# Patient Record
Sex: FEMALE | Race: WHITE | NOT HISPANIC OR LATINO | Employment: OTHER | URBAN - NONMETROPOLITAN AREA
[De-identification: names, ages, dates, MRNs, and addresses within clinical notes are randomized per-mention and may not be internally consistent; named-entity substitution may affect disease eponyms.]

---

## 2018-06-19 ENCOUNTER — APPOINTMENT (OUTPATIENT)
Dept: GENERAL RADIOLOGY | Facility: HOSPITAL | Age: 68
End: 2018-06-19

## 2018-06-19 ENCOUNTER — HOSPITAL ENCOUNTER (EMERGENCY)
Facility: HOSPITAL | Age: 68
Discharge: HOME | End: 2018-06-19
Attending: EMERGENCY MEDICINE | Admitting: EMERGENCY MEDICINE

## 2018-06-19 ENCOUNTER — APPOINTMENT (OUTPATIENT)
Dept: CT IMAGING | Facility: HOSPITAL | Age: 68
End: 2018-06-19

## 2018-06-19 VITALS
HEART RATE: 65 BPM | SYSTOLIC BLOOD PRESSURE: 160 MMHG | TEMPERATURE: 98.1 F | HEIGHT: 64 IN | RESPIRATION RATE: 15 BRPM | WEIGHT: 162 LBS | OXYGEN SATURATION: 95 % | DIASTOLIC BLOOD PRESSURE: 61 MMHG | BODY MASS INDEX: 27.66 KG/M2

## 2018-06-19 DIAGNOSIS — R07.9 CHEST PAIN, UNSPECIFIED TYPE: Primary | ICD-10-CM

## 2018-06-19 LAB
ALBUMIN SERPL-MCNC: 4.3 G/DL (ref 3.5–5)
ALBUMIN/GLOB SERPL: 1.5 G/DL (ref 1.1–2.5)
ALP SERPL-CCNC: 76 U/L (ref 24–120)
ALT SERPL W P-5'-P-CCNC: 39 U/L (ref 0–54)
ANION GAP SERPL CALCULATED.3IONS-SCNC: 13 MMOL/L (ref 4–13)
AST SERPL-CCNC: 30 U/L (ref 7–45)
BASOPHILS # BLD AUTO: 0.05 10*3/MM3 (ref 0–0.2)
BASOPHILS NFR BLD AUTO: 0.7 % (ref 0–2)
BILIRUB SERPL-MCNC: 0.3 MG/DL (ref 0.1–1)
BUN BLD-MCNC: 15 MG/DL (ref 5–21)
BUN/CREAT SERPL: 20.5 (ref 7–25)
CALCIUM SPEC-SCNC: 9.8 MG/DL (ref 8.4–10.4)
CHLORIDE SERPL-SCNC: 102 MMOL/L (ref 98–110)
CO2 SERPL-SCNC: 26 MMOL/L (ref 24–31)
CREAT BLD-MCNC: 0.73 MG/DL (ref 0.5–1.4)
D DIMER PPP FEU-MCNC: 0.73 MG/L (FEU) (ref 0–0.5)
DEPRECATED RDW RBC AUTO: 39.4 FL (ref 40–54)
EOSINOPHIL # BLD AUTO: 0.15 10*3/MM3 (ref 0–0.7)
EOSINOPHIL NFR BLD AUTO: 2.1 % (ref 0–4)
ERYTHROCYTE [DISTWIDTH] IN BLOOD BY AUTOMATED COUNT: 12.1 % (ref 12–15)
GFR SERPL CREATININE-BSD FRML MDRD: 80 ML/MIN/1.73
GLOBULIN UR ELPH-MCNC: 2.9 GM/DL
GLUCOSE BLD-MCNC: 101 MG/DL (ref 70–100)
HCT VFR BLD AUTO: 37 % (ref 37–47)
HGB BLD-MCNC: 12.6 G/DL (ref 12–16)
HOLD SPECIMEN: NORMAL
HOLD SPECIMEN: NORMAL
IMM GRANULOCYTES # BLD: 0.03 10*3/MM3 (ref 0–0.03)
IMM GRANULOCYTES NFR BLD: 0.4 % (ref 0–5)
INR PPP: 0.85 (ref 0.91–1.09)
LYMPHOCYTES # BLD AUTO: 2.44 10*3/MM3 (ref 0.72–4.86)
LYMPHOCYTES NFR BLD AUTO: 33.7 % (ref 15–45)
MCH RBC QN AUTO: 30.7 PG (ref 28–32)
MCHC RBC AUTO-ENTMCNC: 34.1 G/DL (ref 33–36)
MCV RBC AUTO: 90.2 FL (ref 82–98)
MONOCYTES # BLD AUTO: 0.6 10*3/MM3 (ref 0.19–1.3)
MONOCYTES NFR BLD AUTO: 8.3 % (ref 4–12)
NEUTROPHILS # BLD AUTO: 3.97 10*3/MM3 (ref 1.87–8.4)
NEUTROPHILS NFR BLD AUTO: 54.8 % (ref 39–78)
NRBC BLD MANUAL-RTO: 0 /100 WBC (ref 0–0)
NT-PROBNP SERPL-MCNC: 71.1 PG/ML (ref 0–900)
PLATELET # BLD AUTO: 188 10*3/MM3 (ref 130–400)
PMV BLD AUTO: 9.8 FL (ref 6–12)
POTASSIUM BLD-SCNC: 3.8 MMOL/L (ref 3.5–5.3)
PROT SERPL-MCNC: 7.2 G/DL (ref 6.3–8.7)
PROTHROMBIN TIME: 11.9 SECONDS (ref 11.9–14.6)
RBC # BLD AUTO: 4.1 10*6/MM3 (ref 4.2–5.4)
SODIUM BLD-SCNC: 141 MMOL/L (ref 135–145)
TROPONIN I SERPL-MCNC: <0.012 NG/ML (ref 0–0.03)
TROPONIN I SERPL-MCNC: <0.012 NG/ML (ref 0–0.03)
WBC NRBC COR # BLD: 7.24 10*3/MM3 (ref 4.8–10.8)
WHOLE BLOOD HOLD SPECIMEN: NORMAL
WHOLE BLOOD HOLD SPECIMEN: NORMAL

## 2018-06-19 PROCEDURE — 85025 COMPLETE CBC W/AUTO DIFF WBC: CPT | Performed by: EMERGENCY MEDICINE

## 2018-06-19 PROCEDURE — 0 IOPAMIDOL PER 1 ML: Performed by: EMERGENCY MEDICINE

## 2018-06-19 PROCEDURE — 71045 X-RAY EXAM CHEST 1 VIEW: CPT

## 2018-06-19 PROCEDURE — 80053 COMPREHEN METABOLIC PANEL: CPT | Performed by: EMERGENCY MEDICINE

## 2018-06-19 PROCEDURE — 71275 CT ANGIOGRAPHY CHEST: CPT

## 2018-06-19 PROCEDURE — 93010 ELECTROCARDIOGRAM REPORT: CPT | Performed by: INTERNAL MEDICINE

## 2018-06-19 PROCEDURE — 99283 EMERGENCY DEPT VISIT LOW MDM: CPT

## 2018-06-19 PROCEDURE — 84484 ASSAY OF TROPONIN QUANT: CPT | Performed by: EMERGENCY MEDICINE

## 2018-06-19 PROCEDURE — 83880 ASSAY OF NATRIURETIC PEPTIDE: CPT | Performed by: EMERGENCY MEDICINE

## 2018-06-19 PROCEDURE — 85610 PROTHROMBIN TIME: CPT | Performed by: EMERGENCY MEDICINE

## 2018-06-19 PROCEDURE — 93005 ELECTROCARDIOGRAM TRACING: CPT | Performed by: EMERGENCY MEDICINE

## 2018-06-19 PROCEDURE — 85379 FIBRIN DEGRADATION QUANT: CPT | Performed by: EMERGENCY MEDICINE

## 2018-06-19 RX ORDER — ASPIRIN 81 MG/1
324 TABLET, CHEWABLE ORAL ONCE
Status: COMPLETED | OUTPATIENT
Start: 2018-06-19 | End: 2018-06-19

## 2018-06-19 RX ORDER — DULOXETIN HYDROCHLORIDE 20 MG/1
20 CAPSULE, DELAYED RELEASE ORAL DAILY
COMMUNITY
End: 2019-11-05

## 2018-06-19 RX ORDER — SODIUM CHLORIDE 0.9 % (FLUSH) 0.9 %
10 SYRINGE (ML) INJECTION AS NEEDED
Status: DISCONTINUED | OUTPATIENT
Start: 2018-06-19 | End: 2018-06-19 | Stop reason: HOSPADM

## 2018-06-19 RX ADMIN — ASPIRIN 81 MG CHEWABLE TABLET 324 MG: 81 TABLET CHEWABLE at 16:55

## 2018-06-19 RX ADMIN — IOPAMIDOL 100 ML: 755 INJECTION, SOLUTION INTRAVENOUS at 18:49

## 2018-06-19 NOTE — ED PROVIDER NOTES
Subjective   Patient with a chief 3 days history of chest pain pressure heartburn going to her back along with nausea and diaphoresis and feeling weak especially with exertion        Chest Pain   Pain location:  Substernal area  Pain quality: burning and pressure    Pain radiates to:  Does not radiate  Pain severity:  Moderate  Onset quality:  Gradual  Timing:  Intermittent  Progression:  Waxing and waning  Chronicity:  New  Context: at rest    Context: not breathing, not drug use, not eating, not lifting and not movement    Relieved by:  Nothing  Worsened by:  Exertion  Ineffective treatments:  None tried  Associated symptoms: no abdominal pain, no AICD problem, no altered mental status, no anorexia, no anxiety, no back pain, no claudication, no cough, no diaphoresis, no dizziness, no dysphagia, no fatigue, no fever, no headache, no heartburn, no lower extremity edema, no nausea, no near-syncope, no numbness, no orthopnea, no palpitations, no PND, no shortness of breath, no syncope, no vomiting and no weakness    Risk factors: diabetes mellitus    Risk factors: no birth control, no coronary artery disease, not male, no Marfan's syndrome, no smoking and no surgery        Review of Systems   Constitutional: Negative.  Negative for activity change, appetite change, chills, diaphoresis, fatigue and fever.   HENT: Negative for congestion, drooling, ear pain, facial swelling, hearing loss, sinus pressure, sore throat and trouble swallowing.    Eyes: Negative.  Negative for discharge.   Respiratory: Negative for cough and shortness of breath.    Cardiovascular: Positive for chest pain. Negative for palpitations, orthopnea, claudication, syncope, PND and near-syncope.   Gastrointestinal: Negative for abdominal distention, abdominal pain, anorexia, blood in stool, diarrhea, heartburn, nausea and vomiting.   Endocrine: Negative.  Negative for cold intolerance, heat intolerance, polydipsia, polyphagia and polyuria.    Genitourinary: Negative.  Negative for dysuria, flank pain and urgency.   Musculoskeletal: Negative.  Negative for arthralgias, back pain, myalgias and neck stiffness.   Skin: Negative.  Negative for color change, pallor and rash.   Allergic/Immunologic: Negative.    Neurological: Negative.  Negative for dizziness, seizures, speech difficulty, weakness, numbness and headaches.   Hematological: Negative.  Negative for adenopathy.   All other systems reviewed and are negative.      Past Medical History:   Diagnosis Date   • Disease of thyroid gland     hypothyroidism   • Eczema    • Hyperlipidemia    • Shoulder pain, left        Allergies   Allergen Reactions   • Sulfa Antibiotics        Past Surgical History:   Procedure Laterality Date   • ANAL FISSURECTOMY     • HYSTERECTOMY     • KNEE ARTHROSCOPY         Family History   Problem Relation Age of Onset   • Cancer Paternal Uncle         colon cancer       Social History     Social History   • Marital status:      Social History Main Topics   • Smoking status: Never Smoker   • Smokeless tobacco: Never Used   • Alcohol use No   • Drug use: Unknown     Other Topics Concern   • Not on file           Objective   Physical Exam   Constitutional: She is oriented to person, place, and time. She appears well-developed and well-nourished.   HENT:   Head: Normocephalic.   Right Ear: External ear normal.   Eyes: Conjunctivae are normal. Pupils are equal, round, and reactive to light.   Neck: Normal range of motion. Neck supple.   Cardiovascular: Normal rate, regular rhythm, normal heart sounds and intact distal pulses.  PMI is not displaced.  Exam reveals no decreased pulses.    No murmur heard.  Pulmonary/Chest: Effort normal and breath sounds normal. No accessory muscle usage. No tachypnea. No respiratory distress. She has no decreased breath sounds. She has no wheezes. She has no rales. She exhibits no tenderness.   Abdominal: Soft. Bowel sounds are normal. There is  no tenderness.   Musculoskeletal: Normal range of motion. She exhibits no edema or tenderness.   Lower extremity exam bilaterally is unremarkable.  There is no right or left calf tenderness .  There is no palpable venous cord.  No obvious difference in the size of the legs.  No pitting edema.  The dorsalis pedis and posterior tibial femoral and popliteal pulses are palpable and +2 bilaterally.  Homans sign is negative   Neurological: She is alert and oriented to person, place, and time. She has normal reflexes. No cranial nerve deficit. Coordination normal.   Skin: Skin is warm. No rash noted. No erythema.   Nursing note and vitals reviewed.      Procedures           ED Course  ED Course as of Jun 19 1819   Tue Jun 19, 2018 1810 Turned over to Dr cruz  [TS]      ED Course User Index  [TS] Delano Blakely MD                  [facility]      Final diagnoses:   Chest pain, unspecified type            Delano Blakely MD  06/19/18 1816       Delano Blakely MD  06/19/18 1819

## 2018-06-19 NOTE — ED NOTES
Dr. Blakely at bedside upon pt arrival to room. EKG not complete from triage and not ordered prior to arriving to room.      Victoria Rodriguez RN  06/19/18 6994

## 2018-06-20 NOTE — DISCHARGE INSTRUCTIONS
Radha,    While your EKG and cardiac enzymes today did not show any signs of heart attack you must understand that things can change minutes, hours, days or weeks after you leave the emergency room. Thus you must return for worsening symptoms, changes in pain or any other issues. I did schedule you a stress test. If they do not call you within 48 hours please call to find out about your appointment. Please take a baby Asprin every day (81 mg)

## 2018-06-20 NOTE — ED PROVIDER NOTES
Endorsed to me pending on repeat CE, CTA and disposition. Two sets of CE and EKG unrevealing. Patient is 67 years old without ACS risk factors (family or personal) with cp now that intermittent for 2 days lasting for 2 minutes at a time for which she cannot name any provoking or alleviating factors. Her heart score is 3. I offered her admission for stress but she has elected for dc with stress at home. She will take baby asprin per day and return for any worsening pain or any other issues.     Family, social and past history reviewed as below, prior documentation of H and Ps and other documentation are reviewed:    Past Medical History:   Diagnosis Date   • Disease of thyroid gland     hypothyroidism   • Eczema    • Hyperlipidemia    • Shoulder pain, left        Past Surgical History:   Procedure Laterality Date   • ANAL FISSURECTOMY     • HYSTERECTOMY     • KNEE ARTHROSCOPY         Social History     Social History   • Marital status:      Spouse name: N/A   • Number of children: N/A   • Years of education: N/A     Occupational History   • Not on file.     Social History Main Topics   • Smoking status: Never Smoker   • Smokeless tobacco: Never Used   • Alcohol use No   • Drug use: Unknown   • Sexual activity: Not on file     Other Topics Concern   • Not on file     Social History Narrative   • No narrative on file       Family history: reviewed and no history of acs      CT Angiogram Chest With Contrast   Final Result   1. No evidence of pulmonary embolus or other acute cardiopulmonary   process.           This report was finalized on 06/19/2018 19:04 by Dr. Calderon Young MD.      XR Chest 1 View   Final Result   1. No radiographic evidence of acute cardiopulmonary process.           This report was finalized on 06/19/2018 17:07 by Dr. Calderon Young MD.        Labs Reviewed   COMPREHENSIVE METABOLIC PANEL - Abnormal; Notable for the following:        Result Value    Glucose 101 (*)     All other  components within normal limits   PROTIME-INR - Abnormal; Notable for the following:     INR 0.85 (*)     All other components within normal limits   CBC WITH AUTO DIFFERENTIAL - Abnormal; Notable for the following:     RBC 4.10 (*)     RDW-SD 39.4 (*)     All other components within normal limits   D-DIMER, QUANTITATIVE - Abnormal; Notable for the following:     D-Dimer, Quantitative 0.73 (*)     All other components within normal limits    Narrative:     Reference Range is 0-0.50 mg/L FEU. However, results <0.50 mg/L FEU tends to rule out DVT or PE. Results >0.50 mg/L FEU are not useful in predicting absence or presence of DVT or PE.   TROPONIN (IN-HOUSE) - Normal   TROPONIN (IN-HOUSE) - Normal   BNP (IN-HOUSE) - Normal   RAINBOW DRAW    Narrative:     The following orders were created for panel order [place] Draw.  Procedure                               Abnormality         Status                     ---------                               -----------         ------                     Light Blue Top[[id number]]                                   Final result               Green Top (Gel)[[id number]]                                  Final result               Lavender Top[[id number]]                                     Final result               Red Top[[id number]]                                          Final result                 Please view results for these tests on the individual orders.   CBC AND DIFFERENTIAL    Narrative:     The following orders were created for panel order CBC & Differential.  Procedure                               Abnormality         Status                     ---------                               -----------         ------                     CBC Auto Differential[[id number]]        Abnormal            Final result                 Please view results for these tests on the individual orders.   LIGHT BLUE TOP   GREEN TOP   LAVENDER TOP   RED TOP      Diagnosis Plan   1. Chest pain,  unspecified type  Adult Stress Echo W/ Cont or Stress Agent if Necessary Per Protocol              Nico Brown MD  06/19/18 2047

## 2018-06-22 ENCOUNTER — HOSPITAL ENCOUNTER (OUTPATIENT)
Dept: CARDIOLOGY | Facility: HOSPITAL | Age: 68
Discharge: HOME | End: 2018-06-22
Attending: EMERGENCY MEDICINE | Admitting: EMERGENCY MEDICINE

## 2018-06-22 VITALS — HEART RATE: 65 BPM | DIASTOLIC BLOOD PRESSURE: 74 MMHG | SYSTOLIC BLOOD PRESSURE: 145 MMHG

## 2018-06-22 DIAGNOSIS — R07.9 CHEST PAIN, UNSPECIFIED TYPE: ICD-10-CM

## 2018-06-22 PROCEDURE — 93350 STRESS TTE ONLY: CPT

## 2018-06-22 PROCEDURE — 93350 STRESS TTE ONLY: CPT | Performed by: INTERNAL MEDICINE

## 2018-06-22 PROCEDURE — 25010000002 PERFLUTREN 6.52 MG/ML SUSPENSION: Performed by: INTERNAL MEDICINE

## 2018-06-22 PROCEDURE — 93018 CV STRESS TEST I&R ONLY: CPT | Performed by: INTERNAL MEDICINE

## 2018-06-22 PROCEDURE — 93017 CV STRESS TEST TRACING ONLY: CPT

## 2018-06-22 PROCEDURE — 93352 ADMIN ECG CONTRAST AGENT: CPT | Performed by: INTERNAL MEDICINE

## 2018-06-22 RX ADMIN — PERFLUTREN 8.48 MG: 6.52 INJECTION, SUSPENSION INTRAVENOUS at 13:46

## 2018-06-25 LAB
BH CV STRESS BP STAGE 1: NORMAL
BH CV STRESS BP STAGE 2: NORMAL
BH CV STRESS DURATION MIN STAGE 1: 3
BH CV STRESS DURATION MIN STAGE 2: 3
BH CV STRESS DURATION SEC STAGE 1: 0
BH CV STRESS DURATION SEC STAGE 2: 0
BH CV STRESS GRADE STAGE 1: 10
BH CV STRESS GRADE STAGE 2: 12
BH CV STRESS HR STAGE 1: 106
BH CV STRESS HR STAGE 2: 130
BH CV STRESS METS STAGE 1: 5
BH CV STRESS METS STAGE 2: 7.5
BH CV STRESS PROTOCOL 1: NORMAL
BH CV STRESS RECOVERY BP: NORMAL MMHG
BH CV STRESS RECOVERY HR: 83 BPM
BH CV STRESS SPEED STAGE 1: 1.7
BH CV STRESS SPEED STAGE 2: 2.5
BH CV STRESS STAGE 1: 1
BH CV STRESS STAGE 2: 2
MAXIMAL PREDICTED HEART RATE: 152 BPM
PERCENT MAX PREDICTED HR: 85.53 %
STRESS BASELINE BP: NORMAL MMHG
STRESS BASELINE HR: 65 BPM
STRESS PERCENT HR: 101 %
STRESS POST ESTIMATED WORKLOAD: 7.5 METS
STRESS POST EXERCISE DUR MIN: 6 MIN
STRESS POST EXERCISE DUR SEC: 0 SEC
STRESS POST PEAK BP: NORMAL MMHG
STRESS POST PEAK HR: 130 BPM
STRESS TARGET HR: 129 BPM

## 2019-11-05 ENCOUNTER — OFFICE VISIT (OUTPATIENT)
Dept: GASTROENTEROLOGY | Facility: CLINIC | Age: 69
End: 2019-11-05

## 2019-11-05 VITALS
BODY MASS INDEX: 29.53 KG/M2 | HEART RATE: 83 BPM | SYSTOLIC BLOOD PRESSURE: 134 MMHG | HEIGHT: 64 IN | OXYGEN SATURATION: 99 % | TEMPERATURE: 97.8 F | DIASTOLIC BLOOD PRESSURE: 82 MMHG | WEIGHT: 173 LBS

## 2019-11-05 DIAGNOSIS — Z86.010 HX OF COLONIC POLYPS: Primary | ICD-10-CM

## 2019-11-05 DIAGNOSIS — Z80.0 FAMILY HISTORY OF COLON CANCER: ICD-10-CM

## 2019-11-05 PROCEDURE — S0260 H&P FOR SURGERY: HCPCS | Performed by: NURSE PRACTITIONER

## 2019-11-05 RX ORDER — SODIUM, POTASSIUM,MAG SULFATES 17.5-3.13G
1 SOLUTION, RECONSTITUTED, ORAL ORAL EVERY 12 HOURS
Qty: 2 BOTTLE | Refills: 0 | Status: ON HOLD | OUTPATIENT
Start: 2019-11-05 | End: 2019-12-02

## 2019-11-05 NOTE — PROGRESS NOTES
Chief Complaint   Patient presents with   • Colon Cancer Screening     Pt presents today for colon recall-last colon was 7/25/2011; Personal history of colon polyps; Family history of colon cancer     Subjective   HPI  Radha Rosenbaum is a 69 y.o. female who presents as a referral for preventative maintenance. She has no complaints of nausea or vomiting. No change in bowels. No wt loss. No BRBPR. No melena. There is a family hx for colon cancer paternal uncle >60. No abdominal pain. There was no Cologuard screening this year. The patients last colonoscopy was 7/25/11 with findings of adenomatous polyps and right colon AVM.    Past Medical History:   Diagnosis Date   • Cholelithiasis 2010    Galbladder removed   • Diabetes mellitus (CMS/HCC) 2014   • Eczema    • Family history of colon cancer    • Fatty liver 2018   • History of colon polyps    • Hyperlipidemia    • Hypothyroidism    • Lactose intolerance Milk products   • Shoulder pain, left      Past Surgical History:   Procedure Laterality Date   • ANAL FISSURECTOMY     • CHOLECYSTECTOMY  2007 ?   • COLONOSCOPY  07/25/2011    Two less than 1cm tubular adenomatous polyps in the ascending colon; One 5mm hyperplastic polyp in the rectum; Right colon AVM; Repeat 4 years   • FINGER TENDON REPAIR     • HEMORRHOIDECTOMY  1984   • HYSTERECTOMY     • KNEE ARTHROSCOPY         Current Outpatient Medications:   •  levothyroxine (SYNTHROID, LEVOTHROID) 88 MCG tablet, Take 88 mcg by mouth daily., Disp: , Rfl:   •  metFORMIN (GLUCOPHAGE) 1000 MG tablet, Take 1 tablet by mouth Daily., Disp: , Rfl:   •  sodium-potassium-magnesium sulfates (SUPREP BOWEL PREP KIT) 17.5-3.13-1.6 GM/177ML solution oral solution, Take 1 bottle by mouth Every 12 (Twelve) Hours. Split dose prep as directed by office instructions provided.  2 bottles = one kit., Disp: 2 bottle, Rfl: 0  Allergies   Allergen Reactions   • Sulfa Antibiotics      Social History     Socioeconomic History   • Marital status:  "     Spouse name: Not on file   • Number of children: Not on file   • Years of education: Not on file   • Highest education level: Not on file   Tobacco Use   • Smoking status: Former Smoker     Years: 31.00     Start date: 1964     Last attempt to quit: 1998     Years since quittin.4   • Smokeless tobacco: Never Used   Substance and Sexual Activity   • Alcohol use: No   • Drug use: No   • Sexual activity: Not Currently     Partners: Male     Birth control/protection: Surgical     Comment: Hystorectomy.       Family History   Problem Relation Age of Onset   • Colon cancer Paternal Uncle         In his 70's   • Cancer Father 86        Bladder Cancer   • Esophageal cancer Neg Hx    • Liver cancer Neg Hx    • Liver disease Neg Hx    • Rectal cancer Neg Hx    • Stomach cancer Neg Hx        REVIEW OF SYSTEMS  General: well appearing, no fever chills or sweats, no unexplained wt loss  HEENT: no acute visual or hearing disturbances  Cardiovascular: No chest pain or palpitations  Pulmonary: No shortness of breath, coughing, wheezing or hemoptysis  : No burning, urgency, hematuria, or dysuria  Musculoskeletal: No joint pain or stiffness  Peripheral: no edema  Skin: No lesions or rashes  Neuro: No dizziness, headaches, stroke, syncope  Endocrine: No hot or cold intolerances  Hematological: No blood dyscrasias    Objective   Vitals:    19 1409   BP: 134/82   BP Location: Left arm   Patient Position: Sitting   Cuff Size: Adult   Pulse: 83   Temp: 97.8 °F (36.6 °C)   TempSrc: Tympanic   SpO2: 99%   Weight: 78.5 kg (173 lb)   Height: 162.6 cm (64\")     Body mass index is 29.7 kg/m².    PHYSICAL EXAM  General: age appropriate well nourished well appearing, no acute distress  Head: normocephalic and atraumatic  Global assessment-supple  Neck-No JVD noted, no lymphadenopathy  Pulmonary-clear to auscultation bilaterally, normal respiratory effort  Cardiovascular-normal rate and rhythm, normal heart " sounds, S1 and S2 noted  Abdomen-soft, non tender, non distended, normal bowel sounds all 4 quadrants, no hepatosplenomegaly noted  Extremities-No clubbing cyanosis or edema  Neuro-Non focal, converses appropriately, awake, alert, oriented    Imaging Results (Most Recent)     None        Assessment/Plan   Radha was seen today for colon cancer screening.    Diagnoses and all orders for this visit:    Hx of colonic polyps  -     Case Request; Standing  -     Case Request    Family history of colon cancer  Comments:  paternal uncle >60   Orders:  -     Case Request; Standing  -     Case Request    Other orders  -     Follow Anesthesia Guidelines / Standing Orders; Future  -     Obtain Informed Consent; Future  -     Implement Anesthesia Orders Day of Procedure; Standing  -     Obtain Informed Consent; Standing  -     Verify bowel prep was successful; Standing  -     sodium-potassium-magnesium sulfates (SUPREP BOWEL PREP KIT) 17.5-3.13-1.6 GM/177ML solution oral solution; Take 1 bottle by mouth Every 12 (Twelve) Hours. Split dose prep as directed by office instructions provided.  2 bottles = one kit.      COLONOSCOPY WITH ANESTHESIA (N/A)       Body mass index is 29.7 kg/m². Patient's Body mass index is 29.7 kg/m². BMI is within normal parameters. No follow-up required..      All risks, benefits, alternatives, and indications of colonoscopy procedure have been discussed with the patient. Risks to include perforation of the colon requiring possible surgery or colostomy, risk of bleeding from biopsies or removal of colon tissue, possibility of missing a colon polyp or cancer, or adverse drug reaction.  Benefits to include the diagnosis and management of disease of the colon and rectum. Alternatives to include barium enema, radiographic evaluation, lab testing or no intervention. Pt verbalizes understanding and agrees.

## 2019-11-06 PROBLEM — Z80.0 FAMILY HISTORY OF COLON CANCER: Status: ACTIVE | Noted: 2019-11-06

## 2019-11-06 PROBLEM — Z86.0100 HX OF COLONIC POLYPS: Status: ACTIVE | Noted: 2019-11-06

## 2019-11-06 PROBLEM — Z86.010 HX OF COLONIC POLYPS: Status: ACTIVE | Noted: 2019-11-06

## 2019-12-02 ENCOUNTER — ANESTHESIA (OUTPATIENT)
Dept: GASTROENTEROLOGY | Facility: HOSPITAL | Age: 69
End: 2019-12-02

## 2019-12-02 ENCOUNTER — HOSPITAL ENCOUNTER (OUTPATIENT)
Facility: HOSPITAL | Age: 69
Setting detail: HOSPITAL OUTPATIENT SURGERY
Discharge: HOME | End: 2019-12-02
Attending: INTERNAL MEDICINE | Admitting: INTERNAL MEDICINE

## 2019-12-02 ENCOUNTER — ANESTHESIA EVENT (OUTPATIENT)
Dept: GASTROENTEROLOGY | Facility: HOSPITAL | Age: 69
End: 2019-12-02

## 2019-12-02 VITALS
TEMPERATURE: 96.7 F | OXYGEN SATURATION: 99 % | SYSTOLIC BLOOD PRESSURE: 136 MMHG | HEART RATE: 78 BPM | RESPIRATION RATE: 12 BRPM | DIASTOLIC BLOOD PRESSURE: 59 MMHG | HEIGHT: 64 IN | WEIGHT: 173 LBS | BODY MASS INDEX: 29.53 KG/M2

## 2019-12-02 DIAGNOSIS — Z80.0 FAMILY HISTORY OF COLON CANCER: ICD-10-CM

## 2019-12-02 DIAGNOSIS — Z86.010 HX OF COLONIC POLYPS: ICD-10-CM

## 2019-12-02 LAB — GLUCOSE BLDC GLUCOMTR-MCNC: 148 MG/DL (ref 70–130)

## 2019-12-02 PROCEDURE — 45385 COLONOSCOPY W/LESION REMOVAL: CPT | Performed by: INTERNAL MEDICINE

## 2019-12-02 PROCEDURE — 25010000002 PROPOFOL 10 MG/ML EMULSION: Performed by: NURSE ANESTHETIST, CERTIFIED REGISTERED

## 2019-12-02 PROCEDURE — 88305 TISSUE EXAM BY PATHOLOGIST: CPT | Performed by: INTERNAL MEDICINE

## 2019-12-02 PROCEDURE — 82962 GLUCOSE BLOOD TEST: CPT

## 2019-12-02 RX ORDER — SODIUM CHLORIDE 9 MG/ML
500 INJECTION, SOLUTION INTRAVENOUS CONTINUOUS PRN
Status: DISCONTINUED | OUTPATIENT
Start: 2019-12-02 | End: 2019-12-02 | Stop reason: HOSPADM

## 2019-12-02 RX ORDER — SODIUM CHLORIDE 0.9 % (FLUSH) 0.9 %
10 SYRINGE (ML) INJECTION AS NEEDED
Status: DISCONTINUED | OUTPATIENT
Start: 2019-12-02 | End: 2019-12-02 | Stop reason: HOSPADM

## 2019-12-02 RX ORDER — SODIUM CHLORIDE 0.9 % (FLUSH) 0.9 %
3-10 SYRINGE (ML) INJECTION AS NEEDED
Status: CANCELLED | OUTPATIENT
Start: 2019-12-02

## 2019-12-02 RX ORDER — SODIUM CHLORIDE 9 MG/ML
100 INJECTION, SOLUTION INTRAVENOUS CONTINUOUS
Status: CANCELLED | OUTPATIENT
Start: 2019-12-02

## 2019-12-02 RX ORDER — PROPOFOL 10 MG/ML
VIAL (ML) INTRAVENOUS AS NEEDED
Status: DISCONTINUED | OUTPATIENT
Start: 2019-12-02 | End: 2019-12-02 | Stop reason: SURG

## 2019-12-02 RX ORDER — SODIUM CHLORIDE 0.9 % (FLUSH) 0.9 %
3 SYRINGE (ML) INJECTION EVERY 12 HOURS SCHEDULED
Status: CANCELLED | OUTPATIENT
Start: 2019-12-02

## 2019-12-02 RX ADMIN — PROPOFOL 250 MG: 10 INJECTION, EMULSION INTRAVENOUS at 09:18

## 2019-12-02 RX ADMIN — LIDOCAINE HYDROCHLORIDE 100 MG: 20 INJECTION, SOLUTION INTRAVENOUS at 09:18

## 2019-12-02 RX ADMIN — SODIUM CHLORIDE 500 ML: 9 INJECTION, SOLUTION INTRAVENOUS at 09:05

## 2019-12-02 NOTE — ANESTHESIA PREPROCEDURE EVALUATION
Anesthesia Evaluation     no history of anesthetic complications:  NPO Solid Status: > 8 hours  NPO Liquid Status: > 4 hours           Airway   Mallampati: II  TM distance: >3 FB  Neck ROM: full  Dental          Pulmonary - normal exam    breath sounds clear to auscultation  (-) asthma, recent URI, sleep apnea, not a smoker  Cardiovascular - normal exam  Exercise tolerance: good (4-7 METS)    Rhythm: regular  Rate: normal    (+) hyperlipidemia,   (-) pacemaker, hypertension, past MI, angina, cardiac stents, CABG      Neuro/Psych  (-) seizures, TIA, CVA  GI/Hepatic/Renal/Endo    (+)   liver disease fatty liver disease, diabetes mellitus, thyroid problem hypothyroidism  (-) GERD, no renal disease    Musculoskeletal     Abdominal    Substance History      OB/GYN          Other                        Anesthesia Plan    ASA 2     MAC     intravenous induction     Anesthetic plan, all risks, benefits, and alternatives have been provided, discussed and informed consent has been obtained with: patient.

## 2019-12-02 NOTE — ANESTHESIA POSTPROCEDURE EVALUATION
"Patient: Radha Rosenbaum    Procedure Summary     Date:  12/02/19 Room / Location:  [facility] ENDOSCOPY 6 / [facility]    Anesthesia Start:  0915 Anesthesia Stop:  0938    Procedure:  COLONOSCOPY WITH ANESTHESIA (N/A ) Diagnosis:       Hx of colonic polyps      Family history of colon cancer      (Hx of colonic polyps [Z86.010])      (Family history of colon cancer [Z80.0])    Surgeon:  Ratna Mariee MD Provider:  Tere Luna CRNA    Anesthesia Type:  MAC ASA Status:  2          Anesthesia Type: MAC  Last vitals  BP   136/59 (12/02/19 0955)   Temp   96.7 °F (35.9 °C) (12/02/19 0844)   Pulse   78 (12/02/19 0955)   Resp   12 (12/02/19 0955)     SpO2   99 % (12/02/19 0955)     Post Anesthesia Care and Evaluation    Patient location during evaluation: PHASE II  Patient participation: complete - patient participated  Level of consciousness: awake and awake and alert  Pain score: 0  Pain management: adequate  Airway patency: patent  Anesthetic complications: No anesthetic complications  PONV Status: none  Cardiovascular status: acceptable  Respiratory status: acceptable  Hydration status: acceptable    Comments: Patient discharged according to acceptable John score per RN assessment. See nursing records for further information.     Blood pressure 136/59, pulse 78, temperature 96.7 °F (35.9 °C), temperature source Temporal, resp. rate 12, height 162.6 cm (64\"), weight 78.5 kg (173 lb), SpO2 99 %, not currently breastfeeding.        "

## 2019-12-07 LAB
CYTO UR: NORMAL
LAB AP CASE REPORT: NORMAL
PATH REPORT.FINAL DX SPEC: NORMAL
PATH REPORT.GROSS SPEC: NORMAL

## 2020-04-03 RX ORDER — CANAGLIFLOZIN AND METFORMIN HYDROCHLORIDE 150; 500 MG/1; MG/1
1 TABLET, FILM COATED ORAL 2 TIMES DAILY WITH MEALS
COMMUNITY
End: 2020-05-15

## 2020-04-03 RX ORDER — GABAPENTIN 300 MG/1
300 CAPSULE ORAL DAILY
COMMUNITY

## 2020-04-06 ENCOUNTER — VIRTUAL VISIT (OUTPATIENT)
Dept: VASCULAR SURGERY | Age: 70
End: 2020-04-06
Payer: MEDICARE

## 2020-04-06 PROBLEM — I70.213 ATHEROSCLEROSIS OF NATIVE ARTERY OF BOTH LOWER EXTREMITIES WITH INTERMITTENT CLAUDICATION (HCC): Status: ACTIVE | Noted: 2020-04-06

## 2020-04-06 PROBLEM — I70.223 ATHEROSCLEROSIS OF NATIVE ARTERY OF BOTH LOWER EXTREMITIES WITH REST PAIN (HCC): Status: ACTIVE | Noted: 2020-04-06

## 2020-04-06 PROCEDURE — 4040F PNEUMOC VAC/ADMIN/RCVD: CPT | Performed by: NURSE PRACTITIONER

## 2020-04-06 PROCEDURE — 1090F PRES/ABSN URINE INCON ASSESS: CPT | Performed by: NURSE PRACTITIONER

## 2020-04-06 PROCEDURE — 1123F ACP DISCUSS/DSCN MKR DOCD: CPT | Performed by: NURSE PRACTITIONER

## 2020-04-06 PROCEDURE — 1036F TOBACCO NON-USER: CPT | Performed by: NURSE PRACTITIONER

## 2020-04-06 PROCEDURE — G8428 CUR MEDS NOT DOCUMENT: HCPCS | Performed by: NURSE PRACTITIONER

## 2020-04-06 PROCEDURE — G8421 BMI NOT CALCULATED: HCPCS | Performed by: NURSE PRACTITIONER

## 2020-04-06 PROCEDURE — 99203 OFFICE O/P NEW LOW 30 MIN: CPT | Performed by: NURSE PRACTITIONER

## 2020-04-06 PROCEDURE — G8400 PT W/DXA NO RESULTS DOC: HCPCS | Performed by: NURSE PRACTITIONER

## 2020-04-06 PROCEDURE — 3017F COLORECTAL CA SCREEN DOC REV: CPT | Performed by: NURSE PRACTITIONER

## 2020-04-06 NOTE — PROGRESS NOTES
2020    TELEHEALTH EVALUATION -- Audio/Visual (During YSEVR-84 public health emergency)    HPI:    Patient is located at home  Provider is located at [facility]   Also present during call is Jorge Fu (:  1950) has requested an audio/video evaluation for the following concern(s):    Chidi Brady has a history of peripheral vascular disease of the lower extremities. She has had this for < 1 year. Current treatment includes none. Chidi Brady has not had new wounds. Recently, she reports claudication at a distance of  20-25 feet. Chidi Brady reports that the right leg is more signifcant than the left . She reports claudication is worsened and is mostly in the form of crampy type pain starting in the hips, thighs, calves and feet. She has a short recovery time. This is reproducible in nature. She reports ischemic rest pain multiple times per night. She reports walking with cart does not help. Prior to Visit Medications    Medication Sig Taking? Authorizing Provider   canagliflozin-metformin HCl (INVOKAMET) 150-500 MG Take 1 tablet by mouth 2 times daily (with meals)  Historical Provider, MD   Omega-3 Fatty Acids (FISH OIL PO) Take by mouth  Historical Provider, MD   gabapentin (NEURONTIN) 300 MG capsule Take 300 mg by mouth daily. Once nightly before bed. Historical Provider, MD   Chromium-Cinnamon 200-1000 MCG-MG CAPS Take by mouth Two pills once per day  Historical Provider, MD   levothyroxine (SYNTHROID) 100 MCG tablet Take 1 tablet by mouth Daily. MURALI Aragon   azelastine (ASTELIN) 137 MCG/SPRAY nasal spray 2 sprays by Nasal route 2 times daily. Use in each nostril as directed  Patient not taking: Reported on 4/3/2020  MURALI Aragon   pravastatin (PRAVACHOL) 40 MG tablet Take 1 tablet by mouth daily. Patient not taking: Reported on 4/3/2020  MURALI Merritt   metFORMIN (GLUCOPHAGE) 500 MG tablet Take 1 tablet by mouth 2 times daily (with meals).   MURALI Merritt encounter, evaluation of the following organ systems is limited: Vitals/Constitutional/EENT/Resp/CV/GI//MS/Neuro/Skin/Heme-Lymph-Imm. ASSESSMENT/PLAN:  1. Atherosclerosis of native artery of both lower extremities with intermittent claudication ([facility] [address]    2. Atherosclerosis of native artery of both lower extremities with rest pain ([address]        No follow-ups on file. Needs fasting lipid  CTA aorta and runoff   dad had AAA - CT will look for both AAA and tell us what we are dealing with as far as her PVD. She needs surgery. This, at present, falls between the urgent and elective category. If she develops a wound or rest pain progresses, we will need to proceed. She understands this. Patient instructed to walk as much as possible. Call our office with any progressive pain in leg(s) or hip(s) with walking. Take good care of your feet. Let us know right away if you develop a wound on your foot. We will call her as soon as the virus is resolved    Start/Continue ASA EC 81 mg daily  Strongly encouraged start/continue statin therapy  Recommended no smoking  An  electronic signature was used to authenticate this note. --MURALI Becker on 4/6/2020 at 1:16 PM        Pursuant to the emergency declaration under the [facility], [facility] waiver authority and the [employer] Act, this Virtual  Visit was conducted, with patient's consent, to reduce the patient's risk of exposure to COVID-19 and provide continuity of care for an established patient. Services were provided through a video synchronous discussion virtually to substitute for in-person clinic visit.

## 2020-05-05 ENCOUNTER — TELEPHONE (OUTPATIENT)
Dept: VASCULAR SURGERY | Age: 70
End: 2020-05-05

## 2020-05-05 NOTE — TELEPHONE ENCOUNTER
I asked for Merlin Major to call our office back. I spoke with Merlin Major when she called and she was wanting to know about the CT Aorta with Runoff. I told Merlin Major I would get this scheduled and call her back. Unfortunately when I called back she did not answer so I asked her to call our office back. I got her CT Aorta with Run off scheduled for Thursday 5/7/20 arriving at the [facility] across the road from us at 12:30 pm. She will need to go to [address] to register. She also needs to have her labs done while she is there and they are fasting labs. that'll be on the second floor. Merlin Major takes Metformin, she will need to hold this medication the morning of, and two days post CT scan. Merlin Major also needs to be NPO 4-6 hours prior to CT. I was able to get a hold of Merlin Major and give her all the information.

## 2020-05-07 ENCOUNTER — TELEPHONE (OUTPATIENT)
Dept: VASCULAR SURGERY | Age: 70
End: 2020-05-07

## 2020-05-07 ENCOUNTER — HOSPITAL ENCOUNTER (OUTPATIENT)
Dept: CT IMAGING | Age: 70
Discharge: HOME | End: 2020-05-07
Payer: MEDICARE

## 2020-05-07 DIAGNOSIS — I70.213 ATHEROSCLEROSIS OF NATIVE ARTERY OF BOTH LOWER EXTREMITIES WITH INTERMITTENT CLAUDICATION (HCC): ICD-10-CM

## 2020-05-07 LAB
ALBUMIN SERPL-MCNC: 4.6 G/DL (ref 3.5–5.2)
ALP BLD-CCNC: 80 U/L (ref 35–104)
ALT SERPL-CCNC: 25 U/L (ref 5–33)
ANION GAP SERPL CALCULATED.3IONS-SCNC: 13 MMOL/L (ref 7–19)
AST SERPL-CCNC: 21 U/L (ref 5–32)
BILIRUB SERPL-MCNC: <0.2 MG/DL (ref 0.2–1.2)
BUN BLDV-MCNC: 10 MG/DL (ref 8–23)
CALCIUM SERPL-MCNC: 10.2 MG/DL (ref 8.8–10.2)
CHLORIDE BLD-SCNC: 102 MMOL/L (ref 98–111)
CHOLESTEROL, TOTAL: 281 MG/DL (ref 160–199)
CO2: 25 MMOL/L (ref 22–29)
CREAT SERPL-MCNC: 0.8 MG/DL (ref 0.5–0.9)
GFR NON-AFRICAN AMERICAN: 55
GFR NON-AFRICAN AMERICAN: >60
GLUCOSE BLD-MCNC: 141 MG/DL (ref 74–109)
HDLC SERPL-MCNC: 72 MG/DL (ref 65–121)
LDL CHOLESTEROL CALCULATED: 167 MG/DL
PERFORMED ON: ABNORMAL
POC CREATININE: 1 MG/DL (ref 0.3–1.3)
POC SAMPLE TYPE: ABNORMAL
POTASSIUM SERPL-SCNC: 4.3 MMOL/L (ref 3.5–5)
SODIUM BLD-SCNC: 140 MMOL/L (ref 136–145)
TOTAL PROTEIN: 7.8 G/DL (ref 6.6–8.7)
TRIGL SERPL-MCNC: 209 MG/DL (ref 0–149)

## 2020-05-07 PROCEDURE — 75635 CT ANGIO ABDOMINAL ARTERIES: CPT

## 2020-05-07 PROCEDURE — 82565 ASSAY OF CREATININE: CPT

## 2020-05-07 PROCEDURE — 6360000004 HC RX CONTRAST MEDICATION: Performed by: NURSE PRACTITIONER

## 2020-05-07 RX ADMIN — IOPAMIDOL 150 ML: 755 INJECTION, SOLUTION INTRAVENOUS at 11:03

## 2020-05-07 NOTE — TELEPHONE ENCOUNTER
----- Message from MURALI Acosta sent at 5/7/2020 11:12 AM CDT -----  She really needs an inperson visit with Tammy.   Please set up on a Monday morning

## 2020-05-07 NOTE — TELEPHONE ENCOUNTER
I sw the pt to let her know she is scheduled on 5/11/2020 at 10:00 am to see Dr. Claudean Spears to discuss the results of her CTA as well as options. Pt voiced understanding and is aware of the appt.

## 2020-05-11 ENCOUNTER — OFFICE VISIT (OUTPATIENT)
Dept: VASCULAR SURGERY | Age: 70
End: 2020-05-11
Payer: MEDICARE

## 2020-05-11 ENCOUNTER — TELEPHONE (OUTPATIENT)
Dept: VASCULAR SURGERY | Age: 70
End: 2020-05-11

## 2020-05-11 VITALS
HEART RATE: 80 BPM | OXYGEN SATURATION: 98 % | SYSTOLIC BLOOD PRESSURE: 136 MMHG | RESPIRATION RATE: 18 BRPM | DIASTOLIC BLOOD PRESSURE: 78 MMHG

## 2020-05-11 PROCEDURE — 99214 OFFICE O/P EST MOD 30 MIN: CPT | Performed by: PHYSICIAN ASSISTANT

## 2020-05-11 PROCEDURE — 4040F PNEUMOC VAC/ADMIN/RCVD: CPT | Performed by: PHYSICIAN ASSISTANT

## 2020-05-11 PROCEDURE — 1090F PRES/ABSN URINE INCON ASSESS: CPT | Performed by: PHYSICIAN ASSISTANT

## 2020-05-11 PROCEDURE — 1123F ACP DISCUSS/DSCN MKR DOCD: CPT | Performed by: PHYSICIAN ASSISTANT

## 2020-05-11 PROCEDURE — 3017F COLORECTAL CA SCREEN DOC REV: CPT | Performed by: PHYSICIAN ASSISTANT

## 2020-05-11 PROCEDURE — 1036F TOBACCO NON-USER: CPT | Performed by: PHYSICIAN ASSISTANT

## 2020-05-11 PROCEDURE — G8421 BMI NOT CALCULATED: HCPCS | Performed by: PHYSICIAN ASSISTANT

## 2020-05-11 PROCEDURE — G8400 PT W/DXA NO RESULTS DOC: HCPCS | Performed by: PHYSICIAN ASSISTANT

## 2020-05-11 PROCEDURE — G8427 DOCREV CUR MEDS BY ELIG CLIN: HCPCS | Performed by: PHYSICIAN ASSISTANT

## 2020-05-11 NOTE — TELEPHONE ENCOUNTER
Spoke with Bebo Rosales LPN with Cardiology, informed her that we have a patient that Dr. Froy Mcgarry is needing to schedule for surgery ([id number] vs [id number] bypass) and we need cardiac clearance prior to scheduling surgery. She will be a new patient to their practice. Appointment was given for 5/15/20 at 8:45am with Yaa Cullen NP. Written request for cardiac clearance was sent to [facility] through [address] [place] [address] Rd.

## 2020-05-11 NOTE — PROGRESS NOTES
levothyroxine (SYNTHROID) 100 MCG tablet Take 1 tablet by mouth Daily. 90 tablet 0    azelastine (ASTELIN) 137 MCG/SPRAY nasal spray 2 sprays by Nasal route 2 times daily. Use in each nostril as directed 1 Bottle 0    pravastatin (PRAVACHOL) 40 MG tablet Take 1 tablet by mouth daily. 90 tablet 1    metFORMIN (GLUCOPHAGE) 500 MG tablet Take 1 tablet by mouth 2 times daily (with meals). 180 tablet 1    Multiple Vitamins-Minerals (MULTIVITAL PO) Take  by mouth daily. No current facility-administered medications for this visit. Allergies: Sulfa antibiotics  Past Medical History:   Diagnosis Date    Anemia     Arthritis     Diabetes mellitus type II     Fibromyalgia     Hyperlipidemia     Hypothyroid     Right foot drop     Spinal stenosis      Past Surgical History:   Procedure Laterality Date    BREAST BIOPSY Right     CHOLECYSTECTOMY      HYSTERECTOMY      partial    SHOULDER SURGERY       Family History   Problem Relation Age of Onset    Stroke Mother     High Blood Pressure Father     Cancer Father     Asthma Sister      Social History     Tobacco Use    Smoking status: Former Smoker     Types: Cigarettes     Last attempt to quit: 1995     Years since quittin.0    Smokeless tobacco: Never Used   Substance Use Topics    Alcohol use: No       Review of Systems    Constitutional - no significant activity change, appetite change, or unexpected weight change. No fever or chills. No diaphoresis or significant fatigue. HENT - no significant rhinorrhea or epistaxis. No tinnitus or significant hearing loss. Eyes - no sudden vision change or amaurosis. Respiratory - no significant shortness of breath, wheezing, or stridor. No apnea, cough, or chest tightness associated with shortness of breath. Cardiovascular - no chest pain, syncope, or significant dizziness. No palpitations or significant leg swelling. (see HPI)  Gastrointestinal - no abdominal swelling or pain.   No blood

## 2020-05-15 ENCOUNTER — OFFICE VISIT (OUTPATIENT)
Dept: CARDIOLOGY | Age: 70
End: 2020-05-15
Payer: MEDICARE

## 2020-05-15 VITALS
SYSTOLIC BLOOD PRESSURE: 128 MMHG | DIASTOLIC BLOOD PRESSURE: 70 MMHG | BODY MASS INDEX: 30.56 KG/M2 | HEIGHT: 64 IN | HEART RATE: 74 BPM | WEIGHT: 179 LBS

## 2020-05-15 PROCEDURE — 1090F PRES/ABSN URINE INCON ASSESS: CPT | Performed by: CLINICAL NURSE SPECIALIST

## 2020-05-15 PROCEDURE — 1036F TOBACCO NON-USER: CPT | Performed by: CLINICAL NURSE SPECIALIST

## 2020-05-15 PROCEDURE — 2022F DILAT RTA XM EVC RTNOPTHY: CPT | Performed by: CLINICAL NURSE SPECIALIST

## 2020-05-15 PROCEDURE — 3017F COLORECTAL CA SCREEN DOC REV: CPT | Performed by: CLINICAL NURSE SPECIALIST

## 2020-05-15 PROCEDURE — G8417 CALC BMI ABV UP PARAM F/U: HCPCS | Performed by: CLINICAL NURSE SPECIALIST

## 2020-05-15 PROCEDURE — 99213 OFFICE O/P EST LOW 20 MIN: CPT | Performed by: CLINICAL NURSE SPECIALIST

## 2020-05-15 PROCEDURE — 3046F HEMOGLOBIN A1C LEVEL >9.0%: CPT | Performed by: CLINICAL NURSE SPECIALIST

## 2020-05-15 PROCEDURE — G8427 DOCREV CUR MEDS BY ELIG CLIN: HCPCS | Performed by: CLINICAL NURSE SPECIALIST

## 2020-05-15 PROCEDURE — 4040F PNEUMOC VAC/ADMIN/RCVD: CPT | Performed by: CLINICAL NURSE SPECIALIST

## 2020-05-15 PROCEDURE — 1123F ACP DISCUSS/DSCN MKR DOCD: CPT | Performed by: CLINICAL NURSE SPECIALIST

## 2020-05-15 PROCEDURE — G8400 PT W/DXA NO RESULTS DOC: HCPCS | Performed by: CLINICAL NURSE SPECIALIST

## 2020-05-15 PROCEDURE — 93000 ELECTROCARDIOGRAM COMPLETE: CPT | Performed by: CLINICAL NURSE SPECIALIST

## 2020-05-15 RX ORDER — ROSUVASTATIN CALCIUM 10 MG/1
10 TABLET, COATED ORAL DAILY
Qty: 30 TABLET | Refills: 5 | Status: SHIPPED | OUTPATIENT
Start: 2020-05-15

## 2020-05-15 RX ORDER — LEVOTHYROXINE SODIUM 0.05 MG/1
50 TABLET ORAL DAILY
COMMUNITY

## 2020-05-15 NOTE — PATIENT INSTRUCTIONS
with you as you will take them after the stress portion of the test is completed.  Do not eat Bananas 24 hours prior to test.     No caffeine 24 hours prior to the testing. This includes: coffee, pop/soda, chocolate, cold medications, etc.  Any product that might contain caffeine.  No nicotine or alcohol 12 hours prior to your test.    Nothing to eat or drink 6-8 hours prior to appointment time. It is okay to drink small amounts of water during the four hours prior to the test.   Nitroglycerin patches must be taken off 1 hour before testing.  Wear comfortable clothing.  Please refrain from any strenuous exercise or activities the day before your test, or the day of your test.   The Nuclear Lexiscan Stress test takes about 2 ½ to 3 hours to complete. If for any reason you are unable to keep this appointment, please contact Outpatient Scheduling, [phone], as soon as possible to reschedule.

## 2020-05-15 NOTE — PROGRESS NOTES
[facility], [address], [address] [facility]  Phone: [phone]  Fax: [phone]    OFFICE VISIT:  5/15/2020    Cristela Watson - : 1950    Dear Erika DAILEY and MURALI Pace,     I appreciate the opportunity of participating in the care of Cristela Watson. She is a very pleasant 71 y.o. female who I had the opportunity of seeing in my office today, 5/15/20. Records from your office have been obtained and reviewed. Reason For Visit:  Paty Good is a 71 y.o. female who is here for New Patient (nop cardiac symptoms) and Cardiac Clearance  Known diabetic with PVD referred from vascular surgery for upcoming procedure. She has been a diabetic 8-9 years  Today with significant decrease in activity tolerance due to leg pain. It improves with rest after a few minutes. She dates this is gotten progressively worse. He states that she is feeling worn out all the time. Hard to get through her normal activities because of the discomfort. [facility] no exertional chest pain, pressure, burning or squeezing. She is able to lie flat without evidence of orthopnea or paroxysmal nocturnal dyspnea. No symptomatic tachy- or robinson-arrhythmia. No numbness or weakness to suggest cerebrovascular accident or transient ischemic attack. Reports no edema.      Cristela Watson has the following history as recorded in [facility]:  Patient Active Problem List    Diagnosis Date Noted    Atherosclerosis of native artery of both lower extremities with intermittent claudication ([facility] [address] 2020    Atherosclerosis of native artery of both lower extremities with rest pain ([facility] [address] 2020    Hypothyroid     Anemia     Arthritis     Hyperlipidemia     Diabetes mellitus, type II ([address]     Right foot drop      Past Medical History:   Diagnosis Date    Anemia     Arthritis     Diabetes mellitus type II     Fibromyalgia     Hyperlipidemia     Diagnosis Orders   1. Preoperative cardiovascular examination  NM MYOCARDIAL SPECT REST EXERCISE OR RX   2. Mixed hyperlipidemia  EKG 12 lead    NM MYOCARDIAL SPECT REST EXERCISE OR RX   3. Atherosclerosis of native artery of both lower extremities with intermittent claudication (HCC)  NM MYOCARDIAL SPECT REST EXERCISE OR RX   4. Type 2 diabetes mellitus without complication, without long-term current use of insulin (HCC)  NM MYOCARDIAL SPECT REST EXERCISE OR RX   5. GARCIA (dyspnea on exertion)  NM MYOCARDIAL SPECT REST EXERCISE OR RX   EKG today shows sinus rhythm with a rate of 74. Nonspecific ST abnormality. No previous EKG to compare    Type2 diabetic and previous smoker with known peripheral vascular disease needing cardiac evaluation before upcoming vascular procedure. Risk factors for cardiac disease include hyperlipidemia, hypertension, diabetes, previous smoker and known atherosclerosis  Had negative stress test in 2018 at [facility] with no recent evaluation of ischemia. Her set up for nuclear stress test that she would not be able to walk on treadmill. If that is negative we will send information to vascular surgery    Hyperlipidemia with recent cholesterol showing total 281, triglycerides 209, HDL 72 and   Has not taken cholesterol medicine in years. Recommend Crestor 10 mg daily. Can uptitrate as needed. Recheck labs in about 2 months    We had a long discussion regarding diet, exercise and ongoing medical management for atherosclerosis. Her of her last hemoglobin A1c but states she is due for labs soon      Plan  Lexiscan soon  Would recommend starting crestor 10mg daily for lipids and recheck labs in 2-3 mos. Continue low-dose aspirin. Work with PCP to get blood sugar controlled  Follow up in 6 mos if stress testing is negative   Call with any questions or concerns  Follow up with PCP for non cardiac problems  Report any new problems  Cardiovascular Fitness-Exercise as tolerated.   Strive for 30 minutes of exercise most days of the week. Cardiac / Healthy Diet  Continue current medications as directed  Continue plan of treatment        I appreciate the opportunity of participating in the care and treatment of this patient. MURALI Beatty/transcription disclaimer: Much of this encounter note is electronic transcription/translation of spoken language to printed tach. Electronic translation of spoken language may be erroneous, or at times, nonsensical words or phrases may be inadvertently transcribed.  Although, I have reviewed the note for such errors, some may still exist.      Cc:  Jhonatan Diego

## 2020-05-22 ENCOUNTER — HOSPITAL ENCOUNTER (OUTPATIENT)
Dept: NUCLEAR MEDICINE | Age: 70
Discharge: HOME | End: 2020-05-24
Payer: MEDICARE

## 2020-05-22 PROCEDURE — 6360000002 HC RX W HCPCS: Performed by: INTERNAL MEDICINE

## 2020-05-22 PROCEDURE — 3430000000 HC RX DIAGNOSTIC RADIOPHARMACEUTICAL: Performed by: CLINICAL NURSE SPECIALIST

## 2020-05-22 PROCEDURE — A9500 TC99M SESTAMIBI: HCPCS | Performed by: CLINICAL NURSE SPECIALIST

## 2020-05-22 PROCEDURE — 93017 CV STRESS TEST TRACING ONLY: CPT

## 2020-05-22 RX ADMIN — TETRAKIS(2-METHOXYISOBUTYLISOCYANIDE)COPPER(I) TETRAFLUOROBORATE 30 MILLICURIE: 1 INJECTION, POWDER, LYOPHILIZED, FOR SOLUTION INTRAVENOUS at 08:50

## 2020-05-22 RX ADMIN — REGADENOSON 0.4 MG: 0.08 INJECTION, SOLUTION INTRAVENOUS at 10:16

## 2020-05-22 RX ADMIN — TETRAKIS(2-METHOXYISOBUTYLISOCYANIDE)COPPER(I) TETRAFLUOROBORATE 10 MILLICURIE: 1 INJECTION, POWDER, LYOPHILIZED, FOR SOLUTION INTRAVENOUS at 08:50

## 2020-05-26 LAB
LV EF: 83 %
LVEF MODALITY: NORMAL

## 2020-06-25 ENCOUNTER — TELEPHONE (OUTPATIENT)
Dept: VASCULAR SURGERY | Age: 70
End: 2020-06-25

## 2020-06-25 NOTE — TELEPHONE ENCOUNTER
Unable to reach patient by phone in regards to whether she wants to proceed with surgery; left message for patient to call the office.

## 2020-09-03 ENCOUNTER — TELEPHONE (OUTPATIENT)
Dept: VASCULAR SURGERY | Age: 70
End: 2020-09-03

## 2020-09-03 NOTE — TELEPHONE ENCOUNTER
Unable to reach patient by phone in regards to whether she wants to proceed with surgery. Informed Alee Richmond PA-C that I have been unable to reach patient by phone. Per Purnima Garay, send letter to patient to call the office. Letter was mailed.

## 2020-09-09 ENCOUNTER — TELEPHONE (OUTPATIENT)
Dept: VASCULAR SURGERY | Age: 70
End: 2020-09-09

## 2020-09-09 NOTE — TELEPHONE ENCOUNTER
Spoke with patient, she states that she lives in [place] and decided to have her surgery with Dr. Ambrosio Notice since he is closer to her home.

## 2025-04-07 ENCOUNTER — TELEPHONE (OUTPATIENT)
Dept: GASTROENTEROLOGY | Facility: CLINIC | Age: 75
End: 2025-04-07
Payer: MEDICARE

## (undated) DEVICE — ENDOGATOR AUXILIARY WATER JET CONNECTOR: Brand: ENDOGATOR

## (undated) DEVICE — YANKAUER,BULB TIP WITH VENT: Brand: ARGYLE

## (undated) DEVICE — SNAR POLYP SENSATION MICRO OVL 13 240X40

## (undated) DEVICE — MASK,OXYGEN,MED CONC,ADLT,7' TUB, UC: Brand: PENDING

## (undated) DEVICE — SENSR O2 OXIMAX FNGR A/ 18IN NONSTR

## (undated) DEVICE — TBG SMPL FLTR LINE NASL 02/C02 A/ BX/100

## (undated) DEVICE — Device: Brand: DEFENDO AIR/WATER/SUCTION AND BIOPSY VALVE

## (undated) DEVICE — CUFF,BP,DISP,1 TUBE,ADULT,HP: Brand: MEDLINE

## (undated) DEVICE — THE SINGLE USE ETRAP – POLYP TRAP IS USED FOR SUCTION RETRIEVAL OF ENDOSCOPICALLY REMOVED POLYPS.: Brand: ETRAP

## (undated) DEVICE — THE CHANNEL CLEANING BRUSH IS A NYLON FLEXI BRUSH ATTACHED TO A FLEXIBLE PLASTIC SHEATH DESIGNED TO SAFELY REMOVE DEBRIS FROM FLEXIBLE ENDOSCOPES.